# Patient Record
Sex: FEMALE | Race: WHITE | NOT HISPANIC OR LATINO
[De-identification: names, ages, dates, MRNs, and addresses within clinical notes are randomized per-mention and may not be internally consistent; named-entity substitution may affect disease eponyms.]

---

## 2017-08-30 ENCOUNTER — APPOINTMENT (OUTPATIENT)
Dept: OBGYN | Facility: CLINIC | Age: 37
End: 2017-08-30
Payer: MEDICAID

## 2017-08-30 VITALS
WEIGHT: 145 LBS | BODY MASS INDEX: 26.68 KG/M2 | HEIGHT: 62 IN | SYSTOLIC BLOOD PRESSURE: 110 MMHG | DIASTOLIC BLOOD PRESSURE: 74 MMHG

## 2017-08-30 DIAGNOSIS — Z01.419 ENCOUNTER FOR GYNECOLOGICAL EXAMINATION (GENERAL) (ROUTINE) W/OUT ABNORMAL FINDINGS: ICD-10-CM

## 2017-08-30 PROCEDURE — 99395 PREV VISIT EST AGE 18-39: CPT

## 2018-11-16 ENCOUNTER — APPOINTMENT (OUTPATIENT)
Dept: OBGYN | Facility: CLINIC | Age: 38
End: 2018-11-16
Payer: MEDICAID

## 2018-11-16 VITALS
BODY MASS INDEX: 23.4 KG/M2 | DIASTOLIC BLOOD PRESSURE: 83 MMHG | HEART RATE: 82 BPM | WEIGHT: 127.19 LBS | HEIGHT: 62 IN | SYSTOLIC BLOOD PRESSURE: 124 MMHG

## 2018-11-16 DIAGNOSIS — Z90.710 ACQUIRED ABSENCE OF BOTH CERVIX AND UTERUS: ICD-10-CM

## 2018-11-16 PROCEDURE — 99395 PREV VISIT EST AGE 18-39: CPT

## 2018-11-19 LAB
C TRACH RRNA SPEC QL NAA+PROBE: NOT DETECTED
HPV HIGH+LOW RISK DNA PNL CVX: NOT DETECTED
N GONORRHOEA RRNA SPEC QL NAA+PROBE: NOT DETECTED
SOURCE TP AMPLIFICATION: NORMAL

## 2018-11-23 LAB — CYTOLOGY CVX/VAG DOC THIN PREP: NORMAL

## 2018-12-04 ENCOUNTER — APPOINTMENT (OUTPATIENT)
Dept: GASTROENTEROLOGY | Facility: CLINIC | Age: 38
End: 2018-12-04
Payer: MEDICAID

## 2018-12-04 VITALS
WEIGHT: 125 LBS | BODY MASS INDEX: 23 KG/M2 | HEIGHT: 62 IN | DIASTOLIC BLOOD PRESSURE: 82 MMHG | HEART RATE: 83 BPM | SYSTOLIC BLOOD PRESSURE: 124 MMHG

## 2018-12-04 DIAGNOSIS — R10.31 RIGHT LOWER QUADRANT PAIN: ICD-10-CM

## 2018-12-04 DIAGNOSIS — R63.4 ABNORMAL WEIGHT LOSS: ICD-10-CM

## 2018-12-04 DIAGNOSIS — R10.32 LEFT LOWER QUADRANT PAIN: ICD-10-CM

## 2018-12-04 DIAGNOSIS — N92.6 IRREGULAR MENSTRUATION, UNSPECIFIED: ICD-10-CM

## 2018-12-04 DIAGNOSIS — G89.29 LEFT LOWER QUADRANT PAIN: ICD-10-CM

## 2018-12-04 DIAGNOSIS — Z87.19 PERSONAL HISTORY OF OTHER DISEASES OF THE DIGESTIVE SYSTEM: ICD-10-CM

## 2018-12-04 PROCEDURE — 82270 OCCULT BLOOD FECES: CPT

## 2018-12-04 PROCEDURE — 99215 OFFICE O/P EST HI 40 MIN: CPT

## 2018-12-04 RX ORDER — POLYETHYLENE GLYOCOL 3350, SODIUM CHLORIDE, SODIUM BICARBONATE AND POTASSIUM CHLORIDE 420; 11.2; 5.72; 1.48 G/4L; G/4L; G/4L; G/4L
420 POWDER, FOR SOLUTION NASOGASTRIC; ORAL
Qty: 1 | Refills: 0 | Status: ACTIVE | COMMUNITY
Start: 2018-12-04 | End: 1900-01-01

## 2019-02-18 ENCOUNTER — RESULT REVIEW (OUTPATIENT)
Age: 39
End: 2019-02-18

## 2019-02-18 ENCOUNTER — OUTPATIENT (OUTPATIENT)
Dept: OUTPATIENT SERVICES | Facility: HOSPITAL | Age: 39
LOS: 1 days | End: 2019-02-18
Payer: COMMERCIAL

## 2019-02-18 ENCOUNTER — APPOINTMENT (OUTPATIENT)
Dept: GASTROENTEROLOGY | Facility: GI CENTER | Age: 39
End: 2019-02-18
Payer: MEDICAID

## 2019-02-18 DIAGNOSIS — K63.5 POLYP OF COLON: ICD-10-CM

## 2019-02-18 DIAGNOSIS — R10.9 UNSPECIFIED ABDOMINAL PAIN: ICD-10-CM

## 2019-02-18 DIAGNOSIS — K50.018 CROHN'S DISEASE OF SMALL INTESTINE WITH OTHER COMPLICATION: ICD-10-CM

## 2019-02-18 DIAGNOSIS — R63.4 ABNORMAL WEIGHT LOSS: ICD-10-CM

## 2019-02-18 DIAGNOSIS — K51.90 ULCERATIVE COLITIS, UNSPECIFIED, W/OUT COMPLICATIONS: ICD-10-CM

## 2019-02-18 DIAGNOSIS — D12.5 BENIGN NEOPLASM OF SIGMOID COLON: ICD-10-CM

## 2019-02-18 DIAGNOSIS — K52.9 NONINFECTIVE GASTROENTERITIS AND COLITIS, UNSPECIFIED: ICD-10-CM

## 2019-02-18 DIAGNOSIS — R19.8 OTHER SPECIFIED SYMPTOMS AND SIGNS INVOLVING THE DIGESTIVE SYSTEM AND ABDOMEN: ICD-10-CM

## 2019-02-18 PROCEDURE — 88305 TISSUE EXAM BY PATHOLOGIST: CPT

## 2019-02-18 PROCEDURE — 45380 COLONOSCOPY AND BIOPSY: CPT

## 2019-02-18 PROCEDURE — 88305 TISSUE EXAM BY PATHOLOGIST: CPT | Mod: 26

## 2019-02-18 NOTE — REASON FOR VISIT
[Follow-Up: _____] : a [unfilled] follow-up visit [Colonoscopy] : a colonoscopy [FreeTextEntry2] : Hx of UC and flare of sx.  weight loss.

## 2019-02-18 NOTE — HISTORY OF PRESENT ILLNESS
[FreeTextEntry1] : 39 yo WF with UC x 14 yrs.  Off of all medications x 5 yrs.  Recent diarrhea and constipation and weight loss.  No recent BW or stool studies or CT as requested.

## 2019-02-18 NOTE — PHYSICAL EXAM
[General Appearance - Alert] : alert [General Appearance - In No Acute Distress] : in no acute distress [Sclera] : the sclera and conjunctiva were normal [PERRL With Normal Accommodation] : pupils were equal in size, round, and reactive to light [Extraocular Movements] : extraocular movements were intact [Outer Ear] : the ears and nose were normal in appearance [Oropharynx] : the oropharynx was normal [Auscultation Breath Sounds / Voice Sounds] : lungs were clear to auscultation bilaterally [Heart Rate And Rhythm] : heart rate was normal and rhythm regular [Heart Sounds] : normal S1 and S2 [Heart Sounds Gallop] : no gallops [Murmurs] : no murmurs [Heart Sounds Pericardial Friction Rub] : no pericardial rub [Bowel Sounds] : normal bowel sounds [Abdomen Soft] : soft [Abdomen Tenderness] : non-tender [] : no hepato-splenomegaly [Abdomen Mass (___ Cm)] : no abdominal mass palpated [Normal Sphincter Tone] : normal sphincter tone [No Rectal Mass] : no rectal mass [Internal Hemorrhoid] : no internal hemorrhoids [External Hemorrhoid] : no external hemorrhoids [Occult Blood Positive] : stool was negative for occult blood [FreeTextEntry1] : no hemorrhoids. No lesions. No fistula;  Empty rectal vault;  No stool or blood.  [No CVA Tenderness] : no ~M costovertebral angle tenderness [No Spinal Tenderness] : no spinal tenderness [Abnormal Walk] : normal gait [Nail Clubbing] : no clubbing  or cyanosis of the fingernails [Musculoskeletal - Swelling] : no joint swelling seen [Motor Tone] : muscle strength and tone were normal

## 2019-02-18 NOTE — HISTORY OF PRESENT ILLNESS
[FreeTextEntry1] : 37 yo WF with UC x 14 yrs.  Off of all medications x 5 yrs.  Recent diarrhea and constipation and weight loss.  No recent BW or stool studies or CT as requested.

## 2019-02-18 NOTE — ADDENDUM
[FreeTextEntry1] : No overt neoplasia or dysplasia.  Multiple random biopsy samples were obtained throughout the colon.  Call in 1 week for path check.  Repeat Dysplasia screening colonoscopy in 2 yrs.  No current treatment as no overt colitis is present.  \par Sigmoid colon hyperplastic appearing polyps andd very small.  Call for path check in 1 week.  \par Minor ileitis.  Check path.  Reg diet.

## 2019-02-18 NOTE — PROCEDURE
[With Biopsy] : with biopsy [Colon Cancer Screening] : colon cancer screening [Change in Bowel Habits] : change in bowel habits [Colitis] : colitis [Procedure Explained] : The procedure was explained [Allergies Reviewed] : allergies reviewed. [Risks] : Risks [Benefits] : benefits [Alternatives] : alternatives [Consent Obtained] : written consent was obtained prior to the procedure and is detailed in the patient's record [Patient] : the patient [Bowel Prep Kit] : the patient took the appropriate bowel preparation kit as directed [Approved Diet Followed] : the patient avoided solid foods and adhered to the approved diet list for 24 hours prior to the procedure [Automated Blood Pressure Cuff] : automated blood pressure cuff [Cardiac Monitor] : cardiac monitor [Pulse Oximeter] : pulse oximeter [Propofol ___ mg IV] : Propofol [unfilled] ~Umg intravenously [2] : 2 [Prep Qualtiy: ___] : Prep Quality:  [unfilled] [Withdrawal Time: ___] : Withdrawal Time:  [unfilled] [Left Lateral Decubitus] : The patient was positioned in the left lateral decubitus position [Abnormal Rectum] : a normal rectum [External Hemorrhoids] : no external hemorrhoids [Cecum (Landmarks/Transillum)] : and guided to the cecum which was identified by the anatomic landmarks of the appendiceal orifice and ileocecal valve and by transillumination in the right lower quadrant [Terminal Ileum via Ileocecal Valve] : and the terminal ileum was examined by entering the ileocecal valve [No Difficulty] : without difficulty [Insufflated] : insufflated [Multiple Passes Needed] : after multiple passes [Retroflex View] : a retroflex view of the rectum was performed [Polyps] : polyps [Normal] : Normal [Biopsy] : biopsy [Sent to Pathology] : was sent to pathology for analysis [Tolerated Well] : the patient tolerated the procedure well [Vital Signs Stable] : the vital signs were stable [No Complications] : There were no complications [de-identified] : Diarrhea,  weight loss [de-identified] : Trilyte [de-identified] : TI showed minor scattered inflammation.  Bx done. \par ICV was normal. \par Cecum:  normal; 3 random biopsy samples collected to Jar #2.  [de-identified] : 3 random biopsy samples collected to Jar #2, right colon.  [de-identified] : 6 random biopsy samples collected.  [de-identified] : 6 random biopsy samples collected.  [de-identified] : 3 diminutive 3 mm sessile dedra 2a polyps each about 3 mm in size were removed with biopsy forceps and recovered to path.  Location in distal sigmoid colon.  12 random biopsy samples were obtained in the sigmoid colon, 6 in proximal sig colon; and 6 in distal sigmoid colon.     [de-identified] : 6 random biopsy samples were obtained.  No proctitis or hemorrhoids.  No lesions.  Normal dentate line.\par Unable to perform a retroflexion exam.  No external hemorrhoids.    [de-identified] : No overt colitis or neoplasia;  diminutive sigmoid colon polyps, hyperplastic appearing.  Sampled.  Dysplasia screening biopsy samples collected throughout the colon.

## 2019-02-18 NOTE — PROCEDURE
[With Biopsy] : with biopsy [Colon Cancer Screening] : colon cancer screening [Change in Bowel Habits] : change in bowel habits [Colitis] : colitis [Procedure Explained] : The procedure was explained [Allergies Reviewed] : allergies reviewed. [Risks] : Risks [Benefits] : benefits [Alternatives] : alternatives [Consent Obtained] : written consent was obtained prior to the procedure and is detailed in the patient's record [Patient] : the patient [Bowel Prep Kit] : the patient took the appropriate bowel preparation kit as directed [Approved Diet Followed] : the patient avoided solid foods and adhered to the approved diet list for 24 hours prior to the procedure [Automated Blood Pressure Cuff] : automated blood pressure cuff [Cardiac Monitor] : cardiac monitor [Pulse Oximeter] : pulse oximeter [Propofol ___ mg IV] : Propofol [unfilled] ~Umg intravenously [2] : 2 [Prep Qualtiy: ___] : Prep Quality:  [unfilled] [Withdrawal Time: ___] : Withdrawal Time:  [unfilled] [Left Lateral Decubitus] : The patient was positioned in the left lateral decubitus position [Abnormal Rectum] : a normal rectum [External Hemorrhoids] : no external hemorrhoids [Cecum (Landmarks/Transillum)] : and guided to the cecum which was identified by the anatomic landmarks of the appendiceal orifice and ileocecal valve and by transillumination in the right lower quadrant [Terminal Ileum via Ileocecal Valve] : and the terminal ileum was examined by entering the ileocecal valve [No Difficulty] : without difficulty [Insufflated] : insufflated [Multiple Passes Needed] : after multiple passes [Retroflex View] : a retroflex view of the rectum was performed [Polyps] : polyps [Normal] : Normal [Biopsy] : biopsy [Sent to Pathology] : was sent to pathology for analysis [Tolerated Well] : the patient tolerated the procedure well [Vital Signs Stable] : the vital signs were stable [No Complications] : There were no complications [de-identified] : Diarrhea,  weight loss [de-identified] : Trilyte [de-identified] : TI showed minor scattered inflammation.  Bx done. \par ICV was normal. \par Cecum:  normal; 3 random biopsy samples collected to Jar #2.  [de-identified] : 3 random biopsy samples collected to Jar #2, right colon.  [de-identified] : 6 random biopsy samples collected.  [de-identified] : 6 random biopsy samples collected.  [de-identified] : 3 diminutive 3 mm sessile dedra 2a polyps each about 3 mm in size were removed with biopsy forceps and recovered to path.  Location in distal sigmoid colon.  12 random biopsy samples were obtained in the sigmoid colon, 6 in proximal sig colon; and 6 in distal sigmoid colon.     [de-identified] : 6 random biopsy samples were obtained.  No proctitis or hemorrhoids.  No lesions.  Normal dentate line.\par Unable to perform a retroflexion exam.  No external hemorrhoids.    [de-identified] : No overt colitis or neoplasia;  diminutive sigmoid colon polyps, hyperplastic appearing.  Sampled.  Dysplasia screening biopsy samples collected throughout the colon.

## 2019-02-18 NOTE — ASSESSMENT
[FreeTextEntry1] : Appropriate candidate for surveillance colonoscopy and biopsies.  ASA #2;  Optimized.  Consented.

## 2019-02-21 LAB — SURGICAL PATHOLOGY STUDY: SIGNIFICANT CHANGE UP

## 2019-10-24 ENCOUNTER — TRANSCRIPTION ENCOUNTER (OUTPATIENT)
Age: 39
End: 2019-10-24

## 2020-01-13 ENCOUNTER — TRANSCRIPTION ENCOUNTER (OUTPATIENT)
Age: 40
End: 2020-01-13

## 2020-08-19 ENCOUNTER — TRANSCRIPTION ENCOUNTER (OUTPATIENT)
Age: 40
End: 2020-08-19

## 2020-12-09 ENCOUNTER — TRANSCRIPTION ENCOUNTER (OUTPATIENT)
Age: 40
End: 2020-12-09

## 2021-03-22 ENCOUNTER — TRANSCRIPTION ENCOUNTER (OUTPATIENT)
Age: 41
End: 2021-03-22

## 2021-04-28 ENCOUNTER — TRANSCRIPTION ENCOUNTER (OUTPATIENT)
Age: 41
End: 2021-04-28

## 2022-05-16 ENCOUNTER — NON-APPOINTMENT (OUTPATIENT)
Age: 42
End: 2022-05-16

## 2022-06-27 ENCOUNTER — NON-APPOINTMENT (OUTPATIENT)
Age: 42
End: 2022-06-27

## 2023-02-09 ENCOUNTER — NON-APPOINTMENT (OUTPATIENT)
Age: 43
End: 2023-02-09

## 2024-12-01 ENCOUNTER — NON-APPOINTMENT (OUTPATIENT)
Age: 44
End: 2024-12-01